# Patient Record
(demographics unavailable — no encounter records)

---

## 2025-01-17 NOTE — DISCUSSION/SUMMARY
[EKG obtained to assist in diagnosis and management of assessed problem(s)] : EKG obtained to assist in diagnosis and management of assessed problem(s) [FreeTextEntry1] : Patient presents in for follow up.  Mr. Huffman is a 55 year-old  "[place]" with history of hyperlipidemia, family h/o CAD (father's recent 3 vessel coronary bypass and his mothers history of an MI at age 69, followed by sudden death) presents to follow up. Today, he presents in routine follow up. Denies chest pain, shortness of breath, dizziness, lightheadedness, palpitations or near syncope or syncope, orthopnea, PND and increasing lower extremity edema. Walks daily 1 mile daily.  CAC - 186   in light of the worsening DANIELS, with borderline stress test Goldman score of 5 - will pursue a cardiac CTA  # HLD: Continue Atorvastatin 20 mg QD  Check lipids and routine blood test today   # BP Stable - eleavted in the office Encouraged the patient to monitor blood pressure at home, keep a log, and report results back to us for evaluation. Based on results, we will adjust the regimen as necessary. Encouraged  heart healthy diet and exercise as tolerated.  # Echocardiogram to assess the structural and valvular function.   Encouraged patient to continue healthy exercise and eating habits, focusing on a Mediterranean style of eating and aiming for the recommended 150 minutes per week of moderate physical activity. - Encouraged the patient to find healthy outlets and coping mechanisms to help manage stress, such as physical activity/exercise, reducing workload if possible, spending time with family and friends, engaging in an enjoyable hobby, or using meditation or mindfulness techniques.

## 2025-01-17 NOTE — REASON FOR VISIT
[CV Risk Factors and Non-Cardiac Disease] : CV risk factors and non-cardiac disease [Follow-Up - Clinic] : a clinic follow-up of [Hyperlipidemia] : hyperlipidemia [Hypertension] : hypertension [FreeTextEntry1] : family history of coronary heart disease

## 2025-01-17 NOTE — HISTORY OF PRESENT ILLNESS
[FreeTextEntry1] : Patient presents in for follow up.  Mr. Huffman is a 55 year-old  "[place]" with history of hyperlipidemia, family h/o CAD (father's recent 3 vessel coronary bypass and his mothers history of an MI at age 69, followed by sudden death) presents to follow up. Today, he presents in routine follow up. Denies chest pain, shortness of breath, dizziness, lightheadedness, palpitations or near syncope or syncope, orthopnea, PND and increasing lower extremity edema. Walks daily 1 mile daily.  CAC - 186   in light of the worsening DANIELS, with borderline stress test Goldman score of 5 - will pursue a cardiac CTA  # HLD: Continue Atorvastatin 20 mg QD  Check lipids and routine blood test today   # BP Stable - eleavted in the office Encouraged the patient to monitor blood pressure at home, keep a log, and report results back to us for evaluation. Based on results, we will adjust the regimen as necessary. Encouraged  heart healthy diet and exercise as tolerated.  # Echocardiogram to assess the structural and valvular function.   Encouraged patient to continue healthy exercise and eating habits, focusing on a Mediterranean style of eating and aiming for the recommended 150 minutes per week of moderate physical activity. - Encouraged the patient to find healthy outlets and coping mechanisms to help manage stress, such as physical activity/exercise, reducing workload if possible, spending time with family and friends, engaging in an enjoyable hobby, or using meditation or mindfulness techniques.